# Patient Record
Sex: MALE | Race: WHITE
[De-identification: names, ages, dates, MRNs, and addresses within clinical notes are randomized per-mention and may not be internally consistent; named-entity substitution may affect disease eponyms.]

---

## 2019-07-17 ENCOUNTER — HOSPITAL ENCOUNTER (INPATIENT)
Dept: HOSPITAL 8 - ED | Age: 54
LOS: 1 days | Discharge: HOME | DRG: 394 | End: 2019-07-18
Attending: SURGERY | Admitting: SURGERY
Payer: MEDICAID

## 2019-07-17 VITALS — BODY MASS INDEX: 28.89 KG/M2 | HEIGHT: 71 IN | WEIGHT: 206.35 LBS

## 2019-07-17 VITALS — DIASTOLIC BLOOD PRESSURE: 86 MMHG | SYSTOLIC BLOOD PRESSURE: 128 MMHG

## 2019-07-17 VITALS — DIASTOLIC BLOOD PRESSURE: 93 MMHG | SYSTOLIC BLOOD PRESSURE: 161 MMHG

## 2019-07-17 DIAGNOSIS — F17.200: ICD-10-CM

## 2019-07-17 DIAGNOSIS — Z85.038: ICD-10-CM

## 2019-07-17 DIAGNOSIS — Z88.3: ICD-10-CM

## 2019-07-17 DIAGNOSIS — K43.5: Primary | ICD-10-CM

## 2019-07-17 DIAGNOSIS — K59.39: ICD-10-CM

## 2019-07-17 DIAGNOSIS — Z88.8: ICD-10-CM

## 2019-07-17 LAB
ALBUMIN SERPL-MCNC: 3.7 G/DL (ref 3.4–5)
ALP SERPL-CCNC: 114 U/L (ref 45–117)
ALT SERPL-CCNC: 26 U/L (ref 12–78)
ANION GAP SERPL CALC-SCNC: 5 MMOL/L (ref 5–15)
BASOPHILS # BLD AUTO: 0.02 X10^3/UL (ref 0–0.1)
BASOPHILS NFR BLD AUTO: 0 % (ref 0–1)
BILIRUB SERPL-MCNC: 0.8 MG/DL (ref 0.2–1)
CALCIUM SERPL-MCNC: 9.5 MG/DL (ref 8.5–10.1)
CHLORIDE SERPL-SCNC: 108 MMOL/L (ref 98–107)
CREAT SERPL-MCNC: 1.6 MG/DL (ref 0.7–1.3)
EOSINOPHIL # BLD AUTO: 0.12 X10^3/UL (ref 0–0.4)
EOSINOPHIL NFR BLD AUTO: 1 % (ref 1–7)
ERYTHROCYTE [DISTWIDTH] IN BLOOD BY AUTOMATED COUNT: 18.2 % (ref 9.4–14.8)
LYMPHOCYTES # BLD AUTO: 2.08 X10^3/UL (ref 1–3.4)
LYMPHOCYTES NFR BLD AUTO: 24 % (ref 22–44)
MCH RBC QN AUTO: 26 PG (ref 27.5–34.5)
MCHC RBC AUTO-ENTMCNC: 31.9 G/DL (ref 33.2–36.2)
MCV RBC AUTO: 81.6 FL (ref 81–97)
MD: NO
MONOCYTES # BLD AUTO: 0.43 X10^3/UL (ref 0.2–0.8)
MONOCYTES NFR BLD AUTO: 5 % (ref 2–9)
NEUTROPHILS # BLD AUTO: 5.97 X10^3/UL (ref 1.8–6.8)
NEUTROPHILS NFR BLD AUTO: 69 % (ref 42–75)
PLATELET # BLD AUTO: 435 X10^3/UL (ref 130–400)
PMV BLD AUTO: 6.3 FL (ref 7.4–10.4)
PROT SERPL-MCNC: 8.7 G/DL (ref 6.4–8.2)
RBC # BLD AUTO: 5.22 X10^6/UL (ref 4.38–5.82)

## 2019-07-17 PROCEDURE — 99285 EMERGENCY DEPT VISIT HI MDM: CPT

## 2019-07-17 PROCEDURE — 80053 COMPREHEN METABOLIC PANEL: CPT

## 2019-07-17 PROCEDURE — 83690 ASSAY OF LIPASE: CPT

## 2019-07-17 PROCEDURE — 74176 CT ABD & PELVIS W/O CONTRAST: CPT

## 2019-07-17 PROCEDURE — 85025 COMPLETE CBC W/AUTO DIFF WBC: CPT

## 2019-07-17 PROCEDURE — 36415 COLL VENOUS BLD VENIPUNCTURE: CPT

## 2019-07-17 RX ADMIN — HYDROMORPHONE HYDROCHLORIDE PRN MG: 2 INJECTION INTRAMUSCULAR; INTRAVENOUS; SUBCUTANEOUS at 11:26

## 2019-07-17 RX ADMIN — MORPHINE SULFATE PRN MG: 4 INJECTION INTRAVENOUS at 17:41

## 2019-07-17 RX ADMIN — MORPHINE SULFATE PRN MG: 4 INJECTION INTRAVENOUS at 20:35

## 2019-07-17 RX ADMIN — HYDROMORPHONE HYDROCHLORIDE PRN MG: 2 INJECTION INTRAMUSCULAR; INTRAVENOUS; SUBCUTANEOUS at 13:21

## 2019-07-17 RX ADMIN — MORPHINE SULFATE PRN MG: 4 INJECTION INTRAVENOUS at 23:21

## 2019-07-17 NOTE — NUR
PT IS REFUSING THE NG TUBE AT THIS TIME. POC DISCUSSED AND EDUCATION PROVIDED, 
PT VERBALIZED UNDERSTANDING.

## 2019-07-18 VITALS — SYSTOLIC BLOOD PRESSURE: 141 MMHG | DIASTOLIC BLOOD PRESSURE: 93 MMHG

## 2019-07-18 VITALS — DIASTOLIC BLOOD PRESSURE: 87 MMHG | SYSTOLIC BLOOD PRESSURE: 145 MMHG

## 2019-07-18 RX ADMIN — MORPHINE SULFATE PRN MG: 4 INJECTION INTRAVENOUS at 05:44

## 2019-07-18 RX ADMIN — MORPHINE SULFATE PRN MG: 4 INJECTION INTRAVENOUS at 09:30

## 2019-07-18 RX ADMIN — MORPHINE SULFATE PRN MG: 4 INJECTION INTRAVENOUS at 01:42

## 2019-07-20 ENCOUNTER — APPOINTMENT (OUTPATIENT)
Dept: RADIOLOGY | Facility: MEDICAL CENTER | Age: 54
DRG: 394 | End: 2019-07-20
Attending: EMERGENCY MEDICINE
Payer: COMMERCIAL

## 2019-07-20 ENCOUNTER — HOSPITAL ENCOUNTER (INPATIENT)
Facility: MEDICAL CENTER | Age: 54
LOS: 3 days | DRG: 394 | End: 2019-07-23
Attending: EMERGENCY MEDICINE | Admitting: HOSPITALIST
Payer: COMMERCIAL

## 2019-07-20 DIAGNOSIS — K56.609 SMALL BOWEL OBSTRUCTION (HCC): ICD-10-CM

## 2019-07-20 DIAGNOSIS — K46.0 INCARCERATED HERNIA: ICD-10-CM

## 2019-07-20 PROBLEM — D64.9 ANEMIA: Status: ACTIVE | Noted: 2019-07-20

## 2019-07-20 PROBLEM — Z72.0 TOBACCO ABUSE: Status: ACTIVE | Noted: 2019-07-20

## 2019-07-20 PROBLEM — D72.829 LEUKOCYTOSIS: Status: ACTIVE | Noted: 2019-07-20

## 2019-07-20 PROBLEM — N17.9 AKI (ACUTE KIDNEY INJURY) (HCC): Status: ACTIVE | Noted: 2019-07-20

## 2019-07-20 LAB
ALBUMIN SERPL BCP-MCNC: 4.2 G/DL (ref 3.2–4.9)
ALBUMIN/GLOB SERPL: 1.1 G/DL
ALP SERPL-CCNC: 89 U/L (ref 30–99)
ALT SERPL-CCNC: 11 U/L (ref 2–50)
ANION GAP SERPL CALC-SCNC: 9 MMOL/L (ref 0–11.9)
AST SERPL-CCNC: 14 U/L (ref 12–45)
BASOPHILS # BLD AUTO: 0.6 % (ref 0–1.8)
BASOPHILS # BLD: 0.06 K/UL (ref 0–0.12)
BILIRUB SERPL-MCNC: 0.6 MG/DL (ref 0.1–1.5)
BLOOD CULTURE HOLD CXBCH: NORMAL
BUN SERPL-MCNC: 34 MG/DL (ref 8–22)
CALCIUM SERPL-MCNC: 9.5 MG/DL (ref 8.5–10.5)
CHLORIDE SERPL-SCNC: 107 MMOL/L (ref 96–112)
CO2 SERPL-SCNC: 23 MMOL/L (ref 20–33)
CREAT SERPL-MCNC: 1.86 MG/DL (ref 0.5–1.4)
EOSINOPHIL # BLD AUTO: 0.1 K/UL (ref 0–0.51)
EOSINOPHIL NFR BLD: 1 % (ref 0–6.9)
ERYTHROCYTE [DISTWIDTH] IN BLOOD BY AUTOMATED COUNT: 51.5 FL (ref 35.9–50)
GLOBULIN SER CALC-MCNC: 3.7 G/DL (ref 1.9–3.5)
GLUCOSE SERPL-MCNC: 88 MG/DL (ref 65–99)
HCT VFR BLD AUTO: 43.2 % (ref 42–52)
HGB BLD-MCNC: 13.1 G/DL (ref 14–18)
IMM GRANULOCYTES # BLD AUTO: 0.04 K/UL (ref 0–0.11)
IMM GRANULOCYTES NFR BLD AUTO: 0.4 % (ref 0–0.9)
LIPASE SERPL-CCNC: 52 U/L (ref 11–82)
LYMPHOCYTES # BLD AUTO: 2.8 K/UL (ref 1–4.8)
LYMPHOCYTES NFR BLD: 29.1 % (ref 22–41)
MCH RBC QN AUTO: 24.8 PG (ref 27–33)
MCHC RBC AUTO-ENTMCNC: 30.3 G/DL (ref 33.7–35.3)
MCV RBC AUTO: 81.8 FL (ref 81.4–97.8)
MONOCYTES # BLD AUTO: 0.81 K/UL (ref 0–0.85)
MONOCYTES NFR BLD AUTO: 8.4 % (ref 0–13.4)
NEUTROPHILS # BLD AUTO: 5.8 K/UL (ref 1.82–7.42)
NEUTROPHILS NFR BLD: 60.5 % (ref 44–72)
NRBC # BLD AUTO: 0 K/UL
NRBC BLD-RTO: 0 /100 WBC
PLATELET # BLD AUTO: 321 K/UL (ref 164–446)
PMV BLD AUTO: 8.3 FL (ref 9–12.9)
POTASSIUM SERPL-SCNC: 4.4 MMOL/L (ref 3.6–5.5)
PROT SERPL-MCNC: 7.9 G/DL (ref 6–8.2)
RBC # BLD AUTO: 5.28 M/UL (ref 4.7–6.1)
SODIUM SERPL-SCNC: 139 MMOL/L (ref 135–145)
WBC # BLD AUTO: 9.6 K/UL (ref 4.8–10.8)

## 2019-07-20 PROCEDURE — 99407 BEHAV CHNG SMOKING > 10 MIN: CPT | Performed by: HOSPITALIST

## 2019-07-20 PROCEDURE — 96374 THER/PROPH/DIAG INJ IV PUSH: CPT

## 2019-07-20 PROCEDURE — 700111 HCHG RX REV CODE 636 W/ 250 OVERRIDE (IP): Performed by: HOSPITALIST

## 2019-07-20 PROCEDURE — 99285 EMERGENCY DEPT VISIT HI MDM: CPT

## 2019-07-20 PROCEDURE — 96375 TX/PRO/DX INJ NEW DRUG ADDON: CPT

## 2019-07-20 PROCEDURE — 700105 HCHG RX REV CODE 258: Performed by: EMERGENCY MEDICINE

## 2019-07-20 PROCEDURE — 74176 CT ABD & PELVIS W/O CONTRAST: CPT

## 2019-07-20 PROCEDURE — 770001 HCHG ROOM/CARE - MED/SURG/GYN PRIV*

## 2019-07-20 PROCEDURE — 99223 1ST HOSP IP/OBS HIGH 75: CPT | Mod: 25 | Performed by: HOSPITALIST

## 2019-07-20 PROCEDURE — 80053 COMPREHEN METABOLIC PANEL: CPT

## 2019-07-20 PROCEDURE — 85025 COMPLETE CBC W/AUTO DIFF WBC: CPT

## 2019-07-20 PROCEDURE — 83690 ASSAY OF LIPASE: CPT

## 2019-07-20 PROCEDURE — 700111 HCHG RX REV CODE 636 W/ 250 OVERRIDE (IP): Performed by: EMERGENCY MEDICINE

## 2019-07-20 PROCEDURE — 700105 HCHG RX REV CODE 258: Performed by: HOSPITALIST

## 2019-07-20 RX ORDER — SODIUM CHLORIDE 9 MG/ML
1000 INJECTION, SOLUTION INTRAVENOUS ONCE
Status: COMPLETED | OUTPATIENT
Start: 2019-07-20 | End: 2019-07-20

## 2019-07-20 RX ORDER — NICOTINE 21 MG/24HR
14 PATCH, TRANSDERMAL 24 HOURS TRANSDERMAL
Status: DISCONTINUED | OUTPATIENT
Start: 2019-07-21 | End: 2019-07-23 | Stop reason: HOSPADM

## 2019-07-20 RX ORDER — MORPHINE SULFATE 4 MG/ML
4 INJECTION, SOLUTION INTRAMUSCULAR; INTRAVENOUS
Status: DISCONTINUED | OUTPATIENT
Start: 2019-07-20 | End: 2019-07-23 | Stop reason: HOSPADM

## 2019-07-20 RX ORDER — POLYETHYLENE GLYCOL 3350 17 G/17G
1 POWDER, FOR SOLUTION ORAL
Status: DISCONTINUED | OUTPATIENT
Start: 2019-07-20 | End: 2019-07-20

## 2019-07-20 RX ORDER — HYDROMORPHONE HYDROCHLORIDE 1 MG/ML
1 INJECTION, SOLUTION INTRAMUSCULAR; INTRAVENOUS; SUBCUTANEOUS ONCE
Status: COMPLETED | OUTPATIENT
Start: 2019-07-20 | End: 2019-07-20

## 2019-07-20 RX ORDER — ONDANSETRON 2 MG/ML
4 INJECTION INTRAMUSCULAR; INTRAVENOUS EVERY 4 HOURS PRN
Status: DISCONTINUED | OUTPATIENT
Start: 2019-07-20 | End: 2019-07-23 | Stop reason: HOSPADM

## 2019-07-20 RX ORDER — SODIUM CHLORIDE 9 MG/ML
INJECTION, SOLUTION INTRAVENOUS CONTINUOUS
Status: DISCONTINUED | OUTPATIENT
Start: 2019-07-20 | End: 2019-07-23 | Stop reason: HOSPADM

## 2019-07-20 RX ORDER — AMOXICILLIN 250 MG
2 CAPSULE ORAL 2 TIMES DAILY
Status: DISCONTINUED | OUTPATIENT
Start: 2019-07-20 | End: 2019-07-20

## 2019-07-20 RX ORDER — ONDANSETRON 2 MG/ML
4 INJECTION INTRAMUSCULAR; INTRAVENOUS ONCE
Status: COMPLETED | OUTPATIENT
Start: 2019-07-20 | End: 2019-07-20

## 2019-07-20 RX ORDER — OXYCODONE HYDROCHLORIDE 5 MG/1
5 TABLET ORAL
Status: DISCONTINUED | OUTPATIENT
Start: 2019-07-20 | End: 2019-07-23 | Stop reason: HOSPADM

## 2019-07-20 RX ORDER — OXYCODONE HYDROCHLORIDE 10 MG/1
10 TABLET ORAL
Status: DISCONTINUED | OUTPATIENT
Start: 2019-07-20 | End: 2019-07-23 | Stop reason: HOSPADM

## 2019-07-20 RX ORDER — PROMETHAZINE HYDROCHLORIDE 25 MG/1
12.5-25 TABLET ORAL EVERY 4 HOURS PRN
Status: DISCONTINUED | OUTPATIENT
Start: 2019-07-20 | End: 2019-07-23 | Stop reason: HOSPADM

## 2019-07-20 RX ORDER — BISACODYL 10 MG
10 SUPPOSITORY, RECTAL RECTAL
Status: DISCONTINUED | OUTPATIENT
Start: 2019-07-20 | End: 2019-07-20

## 2019-07-20 RX ORDER — ONDANSETRON 4 MG/1
4 TABLET, ORALLY DISINTEGRATING ORAL EVERY 4 HOURS PRN
Status: DISCONTINUED | OUTPATIENT
Start: 2019-07-20 | End: 2019-07-23 | Stop reason: HOSPADM

## 2019-07-20 RX ORDER — PROMETHAZINE HYDROCHLORIDE 25 MG/1
12.5-25 SUPPOSITORY RECTAL EVERY 4 HOURS PRN
Status: DISCONTINUED | OUTPATIENT
Start: 2019-07-20 | End: 2019-07-23 | Stop reason: HOSPADM

## 2019-07-20 RX ADMIN — SODIUM CHLORIDE 1000 ML: 9 INJECTION, SOLUTION INTRAVENOUS at 18:31

## 2019-07-20 RX ADMIN — HYDROMORPHONE HYDROCHLORIDE 1 MG: 1 INJECTION, SOLUTION INTRAMUSCULAR; INTRAVENOUS; SUBCUTANEOUS at 19:50

## 2019-07-20 RX ADMIN — ONDANSETRON 4 MG: 2 INJECTION INTRAMUSCULAR; INTRAVENOUS at 18:31

## 2019-07-20 RX ADMIN — FENTANYL CITRATE 100 MCG: 0.05 INJECTION, SOLUTION INTRAMUSCULAR; INTRAVENOUS at 18:33

## 2019-07-20 RX ADMIN — SODIUM CHLORIDE: 9 INJECTION, SOLUTION INTRAVENOUS at 23:31

## 2019-07-20 RX ADMIN — MORPHINE SULFATE 4 MG: 4 INJECTION INTRAVENOUS at 23:22

## 2019-07-20 ASSESSMENT — ENCOUNTER SYMPTOMS
DIZZINESS: 0
EYE DISCHARGE: 0
MYALGIAS: 0
HALLUCINATIONS: 0
PALPITATIONS: 0
SPEECH CHANGE: 0
HEARTBURN: 0
DEPRESSION: 0
ABDOMINAL PAIN: 1
FOCAL WEAKNESS: 0
FEVER: 0
SHORTNESS OF BREATH: 0
BRUISES/BLEEDS EASILY: 0
BLURRED VISION: 0
HEMOPTYSIS: 0
CHILLS: 0
FLANK PAIN: 0
COUGH: 0
NAUSEA: 1
DOUBLE VISION: 0
SENSORY CHANGE: 0
VOMITING: 1
WEAKNESS: 1

## 2019-07-20 ASSESSMENT — LIFESTYLE VARIABLES: SUBSTANCE_ABUSE: 0

## 2019-07-21 ENCOUNTER — APPOINTMENT (OUTPATIENT)
Dept: RADIOLOGY | Facility: MEDICAL CENTER | Age: 54
DRG: 394 | End: 2019-07-21
Attending: HOSPITALIST
Payer: COMMERCIAL

## 2019-07-21 LAB
ALBUMIN SERPL BCP-MCNC: 3.5 G/DL (ref 3.2–4.9)
ALBUMIN/GLOB SERPL: 1.1 G/DL
ALP SERPL-CCNC: 78 U/L (ref 30–99)
ALT SERPL-CCNC: 11 U/L (ref 2–50)
ANION GAP SERPL CALC-SCNC: 8 MMOL/L (ref 0–11.9)
APTT PPP: 30.5 SEC (ref 24.7–36)
AST SERPL-CCNC: 13 U/L (ref 12–45)
BASOPHILS # BLD AUTO: 0.5 % (ref 0–1.8)
BASOPHILS # BLD: 0.04 K/UL (ref 0–0.12)
BILIRUB SERPL-MCNC: 0.6 MG/DL (ref 0.1–1.5)
BUN SERPL-MCNC: 30 MG/DL (ref 8–22)
CALCIUM SERPL-MCNC: 8.9 MG/DL (ref 8.5–10.5)
CHLORIDE SERPL-SCNC: 111 MMOL/L (ref 96–112)
CO2 SERPL-SCNC: 19 MMOL/L (ref 20–33)
CREAT SERPL-MCNC: 1.57 MG/DL (ref 0.5–1.4)
EOSINOPHIL # BLD AUTO: 0.14 K/UL (ref 0–0.51)
EOSINOPHIL NFR BLD: 1.9 % (ref 0–6.9)
ERYTHROCYTE [DISTWIDTH] IN BLOOD BY AUTOMATED COUNT: 52.1 FL (ref 35.9–50)
GLOBULIN SER CALC-MCNC: 3.1 G/DL (ref 1.9–3.5)
GLUCOSE SERPL-MCNC: 90 MG/DL (ref 65–99)
HCT VFR BLD AUTO: 38.7 % (ref 42–52)
HGB BLD-MCNC: 11.7 G/DL (ref 14–18)
IMM GRANULOCYTES # BLD AUTO: 0.02 K/UL (ref 0–0.11)
IMM GRANULOCYTES NFR BLD AUTO: 0.3 % (ref 0–0.9)
INR PPP: 1.05 (ref 0.87–1.13)
IRON SATN MFR SERPL: 13 % (ref 15–55)
IRON SERPL-MCNC: 44 UG/DL (ref 50–180)
LACTATE BLD-SCNC: 0.7 MMOL/L (ref 0.5–2)
LYMPHOCYTES # BLD AUTO: 2.77 K/UL (ref 1–4.8)
LYMPHOCYTES NFR BLD: 36.6 % (ref 22–41)
MCH RBC QN AUTO: 24.8 PG (ref 27–33)
MCHC RBC AUTO-ENTMCNC: 30.2 G/DL (ref 33.7–35.3)
MCV RBC AUTO: 82.2 FL (ref 81.4–97.8)
MONOCYTES # BLD AUTO: 0.49 K/UL (ref 0–0.85)
MONOCYTES NFR BLD AUTO: 6.5 % (ref 0–13.4)
NEUTROPHILS # BLD AUTO: 4.1 K/UL (ref 1.82–7.42)
NEUTROPHILS NFR BLD: 54.2 % (ref 44–72)
NRBC # BLD AUTO: 0 K/UL
NRBC BLD-RTO: 0 /100 WBC
PLATELET # BLD AUTO: 262 K/UL (ref 164–446)
PMV BLD AUTO: 8.7 FL (ref 9–12.9)
POTASSIUM SERPL-SCNC: 4.2 MMOL/L (ref 3.6–5.5)
PROT SERPL-MCNC: 6.6 G/DL (ref 6–8.2)
PROTHROMBIN TIME: 14 SEC (ref 12–14.6)
RBC # BLD AUTO: 4.71 M/UL (ref 4.7–6.1)
SODIUM SERPL-SCNC: 138 MMOL/L (ref 135–145)
TIBC SERPL-MCNC: 339 UG/DL (ref 250–450)
WBC # BLD AUTO: 7.6 K/UL (ref 4.8–10.8)

## 2019-07-21 PROCEDURE — 302110 OSTOMY WAFER 8X8: Performed by: HOSPITALIST

## 2019-07-21 PROCEDURE — 700111 HCHG RX REV CODE 636 W/ 250 OVERRIDE (IP): Performed by: HOSPITALIST

## 2019-07-21 PROCEDURE — 83550 IRON BINDING TEST: CPT

## 2019-07-21 PROCEDURE — 302111 WAFER OST 2.25IN N IMG RD 2 PC (BARRIER): Performed by: HOSPITALIST

## 2019-07-21 PROCEDURE — 85025 COMPLETE CBC W/AUTO DIFF WBC: CPT

## 2019-07-21 PROCEDURE — 85610 PROTHROMBIN TIME: CPT

## 2019-07-21 PROCEDURE — 83605 ASSAY OF LACTIC ACID: CPT

## 2019-07-21 PROCEDURE — 302109 OSTOMY WAFER 4X4: Performed by: HOSPITALIST

## 2019-07-21 PROCEDURE — 83540 ASSAY OF IRON: CPT

## 2019-07-21 PROCEDURE — 36415 COLL VENOUS BLD VENIPUNCTURE: CPT

## 2019-07-21 PROCEDURE — 770001 HCHG ROOM/CARE - MED/SURG/GYN PRIV*

## 2019-07-21 PROCEDURE — 80053 COMPREHEN METABOLIC PANEL: CPT

## 2019-07-21 PROCEDURE — 302102 BAG OST N IMG 2.25IN 2PC (FECAL): Performed by: HOSPITALIST

## 2019-07-21 PROCEDURE — 85730 THROMBOPLASTIN TIME PARTIAL: CPT

## 2019-07-21 PROCEDURE — 700105 HCHG RX REV CODE 258: Performed by: HOSPITALIST

## 2019-07-21 PROCEDURE — 302085 CLAMP OSTOMY: Performed by: HOSPITALIST

## 2019-07-21 PROCEDURE — 99232 SBSQ HOSP IP/OBS MODERATE 35: CPT | Performed by: HOSPITALIST

## 2019-07-21 RX ADMIN — MORPHINE SULFATE 4 MG: 4 INJECTION INTRAVENOUS at 03:24

## 2019-07-21 RX ADMIN — MORPHINE SULFATE 4 MG: 4 INJECTION INTRAVENOUS at 19:53

## 2019-07-21 RX ADMIN — MORPHINE SULFATE 4 MG: 4 INJECTION INTRAVENOUS at 23:27

## 2019-07-21 RX ADMIN — MORPHINE SULFATE 4 MG: 4 INJECTION INTRAVENOUS at 07:16

## 2019-07-21 RX ADMIN — SODIUM CHLORIDE: 9 INJECTION, SOLUTION INTRAVENOUS at 19:53

## 2019-07-21 RX ADMIN — MORPHINE SULFATE 4 MG: 4 INJECTION INTRAVENOUS at 13:26

## 2019-07-21 RX ADMIN — MORPHINE SULFATE 4 MG: 4 INJECTION INTRAVENOUS at 10:15

## 2019-07-21 RX ADMIN — MORPHINE SULFATE 4 MG: 4 INJECTION INTRAVENOUS at 16:36

## 2019-07-21 RX ADMIN — SODIUM CHLORIDE: 9 INJECTION, SOLUTION INTRAVENOUS at 05:57

## 2019-07-21 ASSESSMENT — ENCOUNTER SYMPTOMS
SHORTNESS OF BREATH: 0
BACK PAIN: 0
CHILLS: 0
DIARRHEA: 0
PALPITATIONS: 0
WEAKNESS: 0
ABDOMINAL PAIN: 1
TREMORS: 0
SENSORY CHANGE: 0
DIAPHORESIS: 0
VOMITING: 1
COUGH: 0
FEVER: 0
SPEECH CHANGE: 0
STRIDOR: 0
FOCAL WEAKNESS: 0
NAUSEA: 1
WHEEZING: 0
FLANK PAIN: 0
NECK PAIN: 0

## 2019-07-21 ASSESSMENT — COGNITIVE AND FUNCTIONAL STATUS - GENERAL
MOBILITY SCORE: 24
DAILY ACTIVITIY SCORE: 24
SUGGESTED CMS G CODE MODIFIER DAILY ACTIVITY: CH
SUGGESTED CMS G CODE MODIFIER MOBILITY: CH

## 2019-07-21 ASSESSMENT — PATIENT HEALTH QUESTIONNAIRE - PHQ9
1. LITTLE INTEREST OR PLEASURE IN DOING THINGS: NOT AT ALL
SUM OF ALL RESPONSES TO PHQ9 QUESTIONS 1 AND 2: 0
2. FEELING DOWN, DEPRESSED, IRRITABLE, OR HOPELESS: NOT AT ALL

## 2019-07-21 ASSESSMENT — LIFESTYLE VARIABLES: ALCOHOL_USE: NO

## 2019-07-21 NOTE — ED TRIAGE NOTES
Chief Complaint   Patient presents with   • Other     no colostomy output x3days   • Abdominal Pain     +swelling   • N/V     [facility] with above complaints. Pt has Ileostomy and has not had output x3days, he was diagnosed with jennie-ostomal hernia . Pt said that he had blockage in the past.

## 2019-07-21 NOTE — PROGRESS NOTES
Nicotine patch refused by guards at bedside. half-way policy prohibits patient receiving patch. Claims patient has not been given patch at the correction.

## 2019-07-21 NOTE — ED NOTES
Med Rec Updated and Complete per Pt at bedside  Allergies Reviewed  No PO ABX last 14 days.    Pt denies RX or OTC medication at this time.

## 2019-07-21 NOTE — ASSESSMENT & PLAN NOTE
Mild, no evidence of bleeding-possibly due to chronic dz, nutritional , dilution.  B12, thyroid function normal

## 2019-07-21 NOTE — ED NOTES
Bedside report given to RN Pritesh. Wound care called for ostomy supplies per MD and message left.

## 2019-07-21 NOTE — CONSULTS
"Surgical Consultation    Date: 7/20/2019    Requesting Physician: Dr. Waters  PCP: Pcp Pt States None  Consulting Physician: Shady Soler M.D.    CC: Abdominal pain, nausea    HPI: This is a 53 y.o. incarcerated male with a history of colon cancer for which he said he had \"my entire colon removed and an ileostomy placed\".  He is presenting with a few days of abdominal discomfort at the area of his ileostomy as well as nausea and vomiting.  He reports minimal fecal output from the colostomy bag over the last 3 days, although the bag is currently full of air almost to the point of bursting.  He currently does not feel distended.  No fever or chills.  No leukocytosis.  His kidney function is reduced.  No hematemesis, chest pain, shortness of breath, neurologic symptoms.    Past Medical History:   Diagnosis Date   • Colon cancer (HCC)        Past Surgical History:   Procedure Laterality Date   • ILEOSTOMY         Current Facility-Administered Medications   Medication Dose Route Frequency Provider Last Rate Last Dose   • NS infusion   Intravenous Continuous Estrellita Sommers M.D.       • ondansetron (ZOFRAN) syringe/vial injection 4 mg  4 mg Intravenous Q4HRS PRN Estrellita Sommers M.D.       • ondansetron (ZOFRAN ODT) dispertab 4 mg  4 mg Oral Q4HRS PRN Estrellita Sommers M.D.       • promethazine (PHENERGAN) tablet 12.5-25 mg  12.5-25 mg Oral Q4HRS PRN Estrellita Sommers M.D.       • promethazine (PHENERGAN) suppository 12.5-25 mg  12.5-25 mg Rectal Q4HRS PRN Estrellita Sommers M.D.       • prochlorperazine (COMPAZINE) injection 5-10 mg  5-10 mg Intravenous Q4HRS PRN Estrellita Sommers M.D.       • Pharmacy Consult Request ...Pain Management Review 1 Each  1 Each Other PHARMACY TO DOSE Estrellita Sommers M.D.        And   • oxyCODONE immediate-release (ROXICODONE) tablet 5 mg  5 mg Oral Q3HRS PRN Estrellita Sommers M.D.        And   • oxyCODONE immediate-release (ROXICODONE) tablet 10 mg  10 mg Oral Q3HRS PRN Estrellita QUISPE" "CHASE Sommers        And   • morphine (pf) 4 mg/ml injection 4 mg  4 mg Intravenous Q3HRS PRN Estrellita Sommers M.D.       • [START ON 7/21/2019] nicotine (NICODERM) 14 MG/24HR 14 mg  14 mg Transdermal Daily-0600 Estrellita Sommers M.D.         No current outpatient prescriptions on file.       Social History     Social History   • Marital status: Legally      Spouse name: N/A   • Number of children: N/A   • Years of education: N/A     Occupational History   • Not on file.     Social History Main Topics   • Smoking status: Current Every Day Smoker     Packs/day: 1.00   • Smokeless tobacco: Never Used   • Alcohol use No   • Drug use: Yes      Comment: heroin last use 7/12/2019   • Sexual activity: Not on file     Other Topics Concern   • Not on file     Social History Narrative   • No narrative on file       History reviewed. No pertinent family history.    Allergies:  Motrin [advil cold-sinus] and Toradol    Review of Systems:  Negative except as noted above in the HPI on 10 point review    Physical Exam:  /104   Pulse 79   Temp 36.7 °C (98 °F) (Temporal)   Resp (!) 1   Ht 1.803 m (5' 11\")   Wt 83.9 kg (185 lb)   SpO2 100%     Constitutional: he is oriented to person, place, and time.  he appears appropriately-developed and well-nourished. No distress.   Head: Normocephalic and atraumatic.   Neck: Normal range of motion. Neck supple. No JVD present. No tracheal deviation present.   Cardiovascular: Normal rate, regular rhythm  Pulmonary/Chest: Breathing is nonlabored on room air, no stridor, no respiratory distress  Abdominal: Soft, nondistended, right lower quadrant ileostomy with appliance in place which is very full of air.  Palpable parastomal hernia with moderate tenderness but no guarding.  No overlying skin change.  Well-healed incisions  Musculoskeletal: Normal range of motion. he exhibits no edema and no tenderness.   Neurological: he is alert and oriented to person, place, and time.  No " gross deficit noted  Skin: Skin is warm and dry. No rash noted. he is not diaphoretic. No erythema. No pallor.   Psychiatric: he has a normal mood and affect.  Behavior is normal.       Labs:  Recent Labs      07/20/19 1818   WBC  9.6   RBC  5.28   HEMOGLOBIN  13.1*   HEMATOCRIT  43.2   MCV  81.8   MCH  24.8*   MCHC  30.3*   RDW  51.5*   PLATELETCT  321   MPV  8.3*     Recent Labs      07/20/19 1818   SODIUM  139   POTASSIUM  4.4   CHLORIDE  107   CO2  23   GLUCOSE  88   BUN  34*   CREATININE  1.86*   CALCIUM  9.5         Recent Labs      07/20/19   1818   ASTSGOT  14   ALTSGPT  11   TBILIRUBIN  0.6   ALKPHOSPHAT  89   GLOBULIN  3.7*       Radiology:  CT-ABDOMEN-PELVIS W/O   Final Result      1.  Right lower quadrant colostomy with peristomal colonic herniation which appears to be at least partly incarcerated, likely accounting for the generalized dilated colon and small bowel throughout the abdomen i.e., at least a partial obstruction.      DX-UPPER GI-SMALL BOWEL FOLLOW THRU    (Results Pending)       Assessment: This is a 53 y.o. incarcerated male with a reported history of total abdominal colectomy and end ileostomy who says he has had a parastomal hernia for 2 years.  He presents with a few days of decreased ostomy output and dehydration.  His abdomen although tender is overall benign and there are no overlying skin changes.  CT read as partial obstruction, but the patient's ostomy is turgid with air.  Appears to be sent for equalization of the small intestine.  He is hemodynamically stable.        Recommendations:   -Given that he is passing air, recommend small bowel follow-through tomorrow.  -In the meantime, if he develops nausea/vomiting, recommend NG tube placement to low wall suction continuous  -IV fluid resuscitation  -No indication for urgent operation at this time.  I will continue to follow and will likely discuss this patient's case with 1 of my colorectal surgery partners for elective  parastomal hernia repair.  -Okay for some ice chips (1 medicine cup per hour)      Shady Soler M.D.  [facility]  [phone]

## 2019-07-21 NOTE — H&P
Hospital Medicine History & Physical Note    Date of Service  7/20/2019    Primary Care Physician  Pcp Pt States None    Consultants  Surgery    Code Status  full    Chief Complaint  abd pain n/v    History of Presenting Illness  53 y.o. male who presented 7/20/2019 with Past medical history of previous colostomy secondary to colon cancer who presents with abdominal pain.  He has not had any output from his colostomy bag over the last 3 days.  He is a diffuse abdominal pain worse with eating with associated nausea and vomiting.  He has had very poor oral intake over the last several days.  He has had diffuse abdominal pain that is also crampy in nature.  Moderate in intensity.  Found to have evidence of incarcerated hernia and small bowel obstruction general surgery at Dr. Soler has been consulted.  She will be admitted to the hospital for further evaluation.    Review of Systems  Review of Systems   Constitutional: Negative for chills and fever.   HENT: Negative for congestion, hearing loss and tinnitus.    Eyes: Negative for blurred vision, double vision and discharge.   Respiratory: Negative for cough, hemoptysis and shortness of breath.    Cardiovascular: Negative for chest pain, palpitations and leg swelling.   Gastrointestinal: Positive for abdominal pain, nausea and vomiting. Negative for heartburn.   Genitourinary: Negative for dysuria and flank pain.   Musculoskeletal: Negative for joint pain and myalgias.   Skin: Negative for rash.   Neurological: Positive for weakness. Negative for dizziness, sensory change, speech change and focal weakness.   Endo/Heme/Allergies: Negative for environmental allergies. Does not bruise/bleed easily.   Psychiatric/Behavioral: Negative for depression, hallucinations and substance abuse.       Past Medical History   has a past medical history of Colon cancer (HCC).    Surgical History   has a past surgical history that includes ileostomy.     Family History  Reviewed and not  pertinent     Social History   reports that he has been smoking.  He has been smoking about 1.00 pack per day. He has never used smokeless tobacco. He reports that he uses drugs. He reports that he does not drink alcohol.    Allergies  Allergies   Allergen Reactions   • Motrin [Advil Cold-Sinus]    • Toradol        Medications  None       Physical Exam  Temp:  [36.7 °C (98 °F)] 36.7 °C (98 °F)  Pulse:  [] 79  Resp:  [1-18] 1  BP: (142)/(104) 142/104  SpO2:  [98 %-100 %] 100 %    Physical Exam   Constitutional: He is oriented to person, place, and time. He appears well-developed and well-nourished. He appears distressed.   HENT:   Head: Normocephalic and atraumatic.   Eyes: Pupils are equal, round, and reactive to light. Conjunctivae and EOM are normal.   Neck: Normal range of motion. Neck supple. No JVD present.   Cardiovascular: Normal rate, regular rhythm, normal heart sounds and intact distal pulses.    No murmur heard.  Pulmonary/Chest: Effort normal and breath sounds normal. No respiratory distress. He exhibits no tenderness.   Abdominal: Soft. Bowel sounds are normal. He exhibits distension. There is tenderness.   Ileostomy    Musculoskeletal: Normal range of motion. He exhibits no edema.   Neurological: He is alert and oriented to person, place, and time. No cranial nerve deficit. He exhibits normal muscle tone.   Skin: Skin is warm and dry. No erythema.   Psychiatric: He has a normal mood and affect. His behavior is normal. Judgment and thought content normal.   Nursing note and vitals reviewed.      Laboratory:  Recent Labs      07/20/19 1818   WBC  9.6   RBC  5.28   HEMOGLOBIN  13.1*   HEMATOCRIT  43.2   MCV  81.8   MCH  24.8*   MCHC  30.3*   RDW  51.5*   PLATELETCT  321   MPV  8.3*     Recent Labs      07/20/19 1818   SODIUM  139   POTASSIUM  4.4   CHLORIDE  107   CO2  23   GLUCOSE  88   BUN  34*   CREATININE  1.86*   CALCIUM  9.5     Recent Labs      07/20/19 1818   ALTSGPT  11   ASTSGOT  14    ALKPHOSPHAT  89   TBILIRUBIN  0.6   LIPASE  52   GLUCOSE  88         No results for input(s): NTPROBNP in the last 72 hours.      No results for input(s): TROPONINT in the last 72 hours.    Urinalysis:    No results found     Imaging:  CT-ABDOMEN-PELVIS W/O   Final Result      1.  Right lower quadrant colostomy with peristomal colonic herniation which appears to be at least partly incarcerated, likely accounting for the generalized dilated colon and small bowel throughout the abdomen i.e., at least a partial obstruction.            Assessment/Plan:  I anticipate this patient will require at least two midnights for appropriate medical management, necessitating inpatient admission.    * Small bowel obstruction (HCC)   Assessment & Plan    Questionable bowel obstruction he does have air and gas in his ileostomy site  NPO for now   Hold NG tube, will place if he has vomiting   IVF   SBF ordered for eval      Anemia   Assessment & Plan    Mild, no evidence of bleeding  Cont to monitor      Incarcerated hernia   Assessment & Plan    NPO  Surgery has been consulted  Check lactic     CLINTON (acute kidney injury) (HCC)   Assessment & Plan    This is hypovolemic due to vomiting and poor intake   IVF for now   Recheck renal function in am   Avoid nephrotoxic medications     Tobacco abuse   Assessment & Plan    Greater than 10 minutes spent with patient smoking cessation counseling. Discussed cardiovascular risk factors of smoking, nicotine patch ordered         VTE prophylaxis: scd

## 2019-07-21 NOTE — ASSESSMENT & PLAN NOTE
Greater than 10 minutes spent with patient smoking cessation counseling started. Emphasize cessation  Nicotine patch.

## 2019-07-21 NOTE — CARE PLAN
Problem: Safety  Goal: Will remain free from injury    Intervention: Provide assistance with mobility  Patient will call before getting up.      Problem: Pain Management  Goal: Pain level will decrease to patient's comfort goal    Intervention: Follow pain managment plan developed in collaboration with patient and Interdisciplinary Team  Patient will be aware of pain managment interventions available.

## 2019-07-21 NOTE — PROGRESS NOTES
Surgery    No acute events.  Ileostomy is now putting out modest amounts of material in addition to the large quantity of gas.  NG tube placed overnight but patient denies nausea and vomiting currently.  Still reports mild discomfort.    Awake, alert, NAD  Breathing is nonlabored on room air  Abdomen is soft, nondistended, the parastomal hernia bulge appears less distended today and there is no overlying skin changes and minimal tenderness.  Large amount of gas again in the ostomy appliance    -Continue with plan for small bowel follow-through today  -Pending findings of small bowel follow-through, gentle bowel regimen  -If small arthralgias are negative, recommend advancing diet to a GI soft  -The patient may follow-up with my colorectal surgery partner for evaluation for elective parastomal hernia repair after discharge.    Shady Soler M.D.  [facility]  [phone]

## 2019-07-21 NOTE — ASSESSMENT & PLAN NOTE
Clinically better.  SBFT without signs of obstruction  IV fluids, plan to start full liquid diet and advance as tolerated  Surgery input.   Prn antiemetics

## 2019-07-21 NOTE — ED PROVIDER NOTES
ED Provider Note    CHIEF COMPLAINT  Chief Complaint   Patient presents with   • Other     no colostomy output x3days   • Abdominal Pain     +swelling   • N/V       HPI  Federico Chaney is a 53 y.o. male who presents with abdominal pain.  The patient has a colostomy secondary to colon cancer in the past.  He states he has not had any output from the colostomy bag in the last 3 days.  The patient presents with diffuse abdominal pain is worse with eating.  He does have associated vomiting as well as anorexia.  He has not had any fevers.  The patient denies difficulties with urination.  The patient states the pain is diffuse and cramping.  The pain at this time is currently moderate in intensity.    REVIEW OF SYSTEMS  See HPI for further details. All other systems are negative.     PAST MEDICAL HISTORY  Past Medical History:   Diagnosis Date   • Colon cancer (HCC)        FAMILY HISTORY  [unfilled]    SOCIAL HISTORY  Social History     Social History   • Marital status: Legally      Spouse name: N/A   • Number of children: N/A   • Years of education: N/A     Social History Main Topics   • Smoking status: Current Every Day Smoker     Packs/day: 1.00   • Smokeless tobacco: Never Used   • Alcohol use No   • Drug use: Yes      Comment: heroin last use 7/12/2019   • Sexual activity: Not on file     Other Topics Concern   • Not on file     Social History Narrative   • No narrative on file       SURGICAL HISTORY  Past Surgical History:   Procedure Laterality Date   • ILEOSTOMY         CURRENT MEDICATIONS  Home Medications     Reviewed by Azalea Mari R.N. (Registered Nurse) on 07/20/19 at 1730  Med List Status: Unable to Obtain   Medication Last Dose Status        Patient Jagdeep Taking any Medications                       ALLERGIES  Allergies   Allergen Reactions   • Motrin [Advil Cold-Sinus]    • Toradol        PHYSICAL EXAM  VITAL SIGNS: /104   Pulse (!) 120   Temp 36.7 °C (98 °F) (Temporal)   Resp 18   " Ht 1.803 m (5' 11\")   Wt 83.9 kg (185 lb)   SpO2 100%   BMI 25.80 kg/m²       Constitutional: Mild acute distress, Non-toxic appearance.   HENT: Normocephalic, Atraumatic, Bilateral external ears normal, Oropharynx moist, No oral exudates, Nose normal.   Eyes: PERRLA, EOMI, Conjunctiva normal, No discharge.   Neck: Normal range of motion, No tenderness, Supple, No stridor.   Lymphatic: No lymphadenopathy noted.   Cardiovascular: Tachycardic heart rate, Normal rhythm, No murmurs, No rubs, No gallops.   Thorax & Lungs: Normal breath sounds, No respiratory distress, No wheezing, No chest tenderness.   Abdomen: Diffuse tenderness, voluntary guarding, hypoactive bowel sounds, the patient does have significant tenderness as well as fullness surrounding the ostomy site with no obvious output.    Skin: Warm, Dry, No erythema, No rash.   Back: No tenderness, No CVA tenderness.   Extremities: Intact distal pulses, No edema, No tenderness, No cyanosis, No clubbing.   Musculoskeletal: Good range of motion in all major joints. No tenderness to palpation or major deformities noted.   Neurologic: Alert & oriented x 3, Normal motor function, Normal sensory function, No focal deficits noted.   Psychiatric: Affect normal, Judgment normal, Mood normal.     RADIOLOGY/PROCEDURES  CT-ABDOMEN-PELVIS W/O   Final Result      1.  Right lower quadrant colostomy with peristomal colonic herniation which appears to be at least partly incarcerated, likely accounting for the generalized dilated colon and small bowel throughout the abdomen i.e., at least a partial obstruction.            COURSE & MEDICAL DECISION MAKING  Pertinent Labs & Imaging studies reviewed. (See chart for details)  This a 53-year-old gentleman who presents the emerge department with abdominal pain.  He does have some significant swelling and tenderness around the ostomy site.  The CT scan does show an incarcerated hernia in this region causing a partial obstruction.  I " suspect this is the cause of the patient's discomfort.  I was unable to mechanically reduce the hernia due to significant pain.  Therefore surgery has been consulted.  The patient does not appear toxic nor septic.  He has received a couple doses of pain medication.  He was also tachycardic on arrival and due to his poor oral intake I suspect that he had dehydration and therefore received a liter fluid intravenously.  On repeat exam he does feel better after the intravenous hydration.  The patient's BUN and creatinine is elevated and some of this could be from dehydration.    FINAL IMPRESSION  1.  Incarcerated colonic hernia through the ostomy site    Disposition  The patient will be admitted for surgical intervention         Electronically signed by: Chris Waters, 7/20/2019 6:11 PM

## 2019-07-21 NOTE — PROGRESS NOTES
[facility] Medicine Daily Progress Note    Date of Service  7/21/2019    Chief Complaint  53 y.o. male admitted 7/20/2019 with abd pain w N/V    Hospital Course    52 yo male hx of colon cancer and colostomy admitted with loss of ostomy output x 3 days, pain and n.v.  Findings of of incarcerated hernia and small bowel obstruction general surgery at Dr. Soler has been consulted    Interval Problem Update     Ileostomy with increased mod output and gas. NGT clamped . Wants to eat.   Plan upper GI series.      Consultants/Specialty    Dr. Soler, surgery     Code Status  Full     Disposition  Anticipate dc home when stable.     Review of Systems  Review of Systems   Constitutional: Negative for chills, diaphoresis, fever and malaise/fatigue.   HENT: Negative for congestion.    Respiratory: Negative for cough, shortness of breath, wheezing and stridor.    Cardiovascular: Negative for chest pain, palpitations and leg swelling.   Gastrointestinal: Positive for abdominal pain, nausea and vomiting. Negative for diarrhea.        Resolving.    Genitourinary: Negative for flank pain and hematuria.   Musculoskeletal: Negative for back pain, joint pain and neck pain.   Neurological: Negative for tremors, sensory change, speech change, focal weakness and weakness.        Physical Exam  Temp:  [36.4 °C (97.6 °F)-37.1 °C (98.8 °F)] 37.1 °C (98.8 °F)  Pulse:  [] 82  Resp:  [1-18] 17  BP: (132-145)/() 145/82  SpO2:  [97 %-100 %] 97 %    Physical Exam   Constitutional: He is oriented to person, place, and time. No distress.   HENT:   Head: Normocephalic and atraumatic.   Nose: Nose normal.   Ngt clamped    Eyes: Conjunctivae and EOM are normal. No scleral icterus.   Neck: Normal range of motion. No JVD present.   Cardiovascular: Normal rate and regular rhythm.    No murmur heard.  Pulmonary/Chest: Effort normal. No stridor. No respiratory distress. He has no wheezes. He has no rales.   Abdominal: Soft. He exhibits no  distension. There is no tenderness.   Ostomy w small amount of gas , liquid   Musculoskeletal: He exhibits no edema or tenderness.   Neurological: He is alert and oriented to person, place, and time.   No gross focal weakness   Skin: Skin is warm and dry. He is not diaphoretic. No pallor.   Psychiatric: He has a normal mood and affect. His behavior is normal.   Vitals reviewed.      Fluids    Intake/Output Summary (Last 24 hours) at 07/21/19 1115  Last data filed at 07/21/19 0100   Gross per 24 hour   Intake            222.5 ml   Output              400 ml   Net           -177.5 ml       Laboratory  Recent Labs      07/20/19 1818 07/21/19   0149   WBC  9.6  7.6   RBC  5.28  4.71   HEMOGLOBIN  13.1*  11.7*   HEMATOCRIT  43.2  38.7*   MCV  81.8  82.2   MCH  24.8*  24.8*   MCHC  30.3*  30.2*   RDW  51.5*  52.1*   PLATELETCT  321  262   MPV  8.3*  8.7*     Recent Labs      07/20/19 1818 07/21/19   0149   SODIUM  139  138   POTASSIUM  4.4  4.2   CHLORIDE  107  111   CO2  23  19*   GLUCOSE  88  90   BUN  34*  30*   CREATININE  1.86*  1.57*   CALCIUM  9.5  8.9     Recent Labs      07/21/19 0149   APTT  30.5   INR  1.05               Imaging  CT-ABDOMEN-PELVIS W/O   Final Result      1.  Right lower quadrant colostomy with peristomal colonic herniation which appears to be at least partly incarcerated, likely accounting for the generalized dilated colon and small bowel throughout the abdomen i.e., at least a partial obstruction.      DX-UPPER GI-SMALL BOWEL FOLLOW THRU    (Results Pending)        Assessment/Plan  * Small bowel obstruction (HCC)   Assessment & Plan    Clinically improving.   Monitor ileostomy output.  Continue IVFs  NGT clamped.  SBFT- if no obstruction - consider started diet.   Surgery input.      Anemia   Assessment & Plan    Mild, no evidence of bleeding, chronic dz, nutritional , dilution  Check iron studies , b12, folate.      Incarcerated hernia   Assessment & Plan    Signs improvement --  continue NPO, IVFs  Fu SBFT,   Surgery input.   IVFs  Prn antiemetics  Pain control      CLINTON (acute kidney injury) (HCC)   Assessment & Plan    This is hypovolemic due to vomiting and poor intake - Cr improved to 1.56  Continue IVFs until consistent intake.  Avoid nephrotoxic medications  Check am renal fxn, lytes and keep eye on UOP.     Tobacco abuse   Assessment & Plan    Greater than 10 minutes spent with patient smoking cessation counseling started. Emphasize cessation  Nicotine patch.          VTE prophylaxis: SCDs

## 2019-07-21 NOTE — PROGRESS NOTES
2 RN skin check completed with Haydee BUCKLEY. No skin break down noted to extremities from cuffs. Adhesive foams placed for prevention. 2nd toe on right foot purple. Face blistered. Patient can turn self from side to side and does so frequently.

## 2019-07-21 NOTE — ASSESSMENT & PLAN NOTE
This is hypovolemic due to vomiting and poor intake -renal function corrected  Start diet  Avoid nephrotoxic medications

## 2019-07-22 LAB
ANION GAP SERPL CALC-SCNC: 8 MMOL/L (ref 0–11.9)
BUN SERPL-MCNC: 22 MG/DL (ref 8–22)
CALCIUM SERPL-MCNC: 9 MG/DL (ref 8.5–10.5)
CHLORIDE SERPL-SCNC: 107 MMOL/L (ref 96–112)
CO2 SERPL-SCNC: 19 MMOL/L (ref 20–33)
CREAT SERPL-MCNC: 1.28 MG/DL (ref 0.5–1.4)
FERRITIN SERPL-MCNC: 43 NG/ML (ref 22–322)
GLUCOSE SERPL-MCNC: 81 MG/DL (ref 65–99)
IRON SERPL-MCNC: 41 UG/DL (ref 50–180)
POTASSIUM SERPL-SCNC: 4.1 MMOL/L (ref 3.6–5.5)
SODIUM SERPL-SCNC: 134 MMOL/L (ref 135–145)
TRANSFERRIN SERPL-MCNC: 234 MG/DL (ref 200–370)
VIT B12 SERPL-MCNC: 240 PG/ML (ref 211–911)

## 2019-07-22 PROCEDURE — 82607 VITAMIN B-12: CPT

## 2019-07-22 PROCEDURE — 302098 PASTE RING (FLAT): Performed by: HOSPITALIST

## 2019-07-22 PROCEDURE — 36415 COLL VENOUS BLD VENIPUNCTURE: CPT

## 2019-07-22 PROCEDURE — 99232 SBSQ HOSP IP/OBS MODERATE 35: CPT | Performed by: HOSPITALIST

## 2019-07-22 PROCEDURE — 84466 ASSAY OF TRANSFERRIN: CPT

## 2019-07-22 PROCEDURE — A9270 NON-COVERED ITEM OR SERVICE: HCPCS | Performed by: HOSPITALIST

## 2019-07-22 PROCEDURE — 700105 HCHG RX REV CODE 258: Performed by: HOSPITALIST

## 2019-07-22 PROCEDURE — 74245 DX-UPPER GI-SMALL BOWEL FOLLOW THRU: CPT

## 2019-07-22 PROCEDURE — 770001 HCHG ROOM/CARE - MED/SURG/GYN PRIV*

## 2019-07-22 PROCEDURE — 700102 HCHG RX REV CODE 250 W/ 637 OVERRIDE(OP): Performed by: HOSPITALIST

## 2019-07-22 PROCEDURE — 700111 HCHG RX REV CODE 636 W/ 250 OVERRIDE (IP): Performed by: HOSPITALIST

## 2019-07-22 PROCEDURE — 83540 ASSAY OF IRON: CPT

## 2019-07-22 PROCEDURE — 82728 ASSAY OF FERRITIN: CPT

## 2019-07-22 PROCEDURE — 80048 BASIC METABOLIC PNL TOTAL CA: CPT

## 2019-07-22 PROCEDURE — 306591 TRAY SUTURE REMOVAL DISP: Performed by: HOSPITALIST

## 2019-07-22 RX ADMIN — MORPHINE SULFATE 4 MG: 4 INJECTION INTRAVENOUS at 07:22

## 2019-07-22 RX ADMIN — MORPHINE SULFATE 4 MG: 4 INJECTION INTRAVENOUS at 15:10

## 2019-07-22 RX ADMIN — SODIUM CHLORIDE: 9 INJECTION, SOLUTION INTRAVENOUS at 12:03

## 2019-07-22 RX ADMIN — OXYCODONE HYDROCHLORIDE 10 MG: 10 TABLET ORAL at 13:56

## 2019-07-22 RX ADMIN — MORPHINE SULFATE 4 MG: 4 INJECTION INTRAVENOUS at 21:19

## 2019-07-22 RX ADMIN — MORPHINE SULFATE 4 MG: 4 INJECTION INTRAVENOUS at 04:05

## 2019-07-22 RX ADMIN — SODIUM CHLORIDE: 9 INJECTION, SOLUTION INTRAVENOUS at 18:14

## 2019-07-22 RX ADMIN — MORPHINE SULFATE 4 MG: 4 INJECTION INTRAVENOUS at 10:22

## 2019-07-22 RX ADMIN — MORPHINE SULFATE 4 MG: 4 INJECTION INTRAVENOUS at 18:14

## 2019-07-22 RX ADMIN — SODIUM CHLORIDE: 9 INJECTION, SOLUTION INTRAVENOUS at 04:07

## 2019-07-22 ASSESSMENT — ENCOUNTER SYMPTOMS
SHORTNESS OF BREATH: 0
NAUSEA: 1
ABDOMINAL PAIN: 1
STRIDOR: 0
FOCAL WEAKNESS: 0
FLANK PAIN: 0
FEVER: 0
DIARRHEA: 0
PALPITATIONS: 0
VOMITING: 1
CHILLS: 0
SPEECH CHANGE: 0
NECK PAIN: 0
BACK PAIN: 0
SENSORY CHANGE: 0
COUGH: 0
DIAPHORESIS: 0
WHEEZING: 0
WEAKNESS: 0
TREMORS: 0

## 2019-07-22 NOTE — PROGRESS NOTES
Patient removed NG tube while changing gown. Anna hospitalist, spoke to MD Fern Alfaro to update about patient condition and was told  leave NG tube out unless patient begins to experience nausea.

## 2019-07-22 NOTE — CARE PLAN
Problem: Communication  Goal: The ability to communicate needs accurately and effectively will improve  Outcome: PROGRESSING AS EXPECTED  Provided patient education about use of call light when needing assistance or wanting to get out of bed, patient verbalizes understanding and demonstrates task appropriately. Call light with in reach, hourly rounding in place.     Problem: Pain Management  Goal: Pain level will decrease to patient's comfort goal  Outcome: PROGRESSING AS EXPECTED  Patient aware of pharmacological interventions available, verbalizes understanding. Education about non-pharmacological interventions provided, patient verbalizes understanding.

## 2019-07-22 NOTE — DISCHARGE PLANNING
Care Transition Team Assessment    Information Source  Orientation : Oriented x 4  Information Given By: Other (Comments) (medical record)  Who is responsible for making decisions for patient? : Patient         Elopement Risk  Legal Hold: No  Ambulatory or Self Mobile in Wheelchair: No-Not an Elopement Risk  Elopement Risk: Not at Risk for Elopement    Interdisciplinary Discharge Planning  Patient or legal guardian wants to designate a caregiver (see row info): No    Discharge Preparedness  What is your plan after discharge?: Other (comment) ([facility])  Prior Functional Level: Independent with Activities of Daily Living    Functional Assesment  Prior Functional Level: Independent with Activities of Daily Living    Finances  Financial Barriers to Discharge: No                   Domestic Abuse  Have you ever been the victim of abuse or violence?: No    Psychological Assessment  History of Substance Abuse: None  History of Psychiatric Problems: No    Discharge Risks or Barriers  Discharge risks or barriers?: Post-acute placement / services    Anticipated Discharge Information  Anticipated discharge disposition: Other (comment) ([facility])

## 2019-07-22 NOTE — PROGRESS NOTES
Surgery    No acute events.  SBFT shows mild delay of barium through small bowel but empties into the ileostomy.    NAD, awake, alert  Breathing is nonlabored on room air  Abdomen is soft, nontender, nondistended with ileostomy in place    -Recommend full liquid diet for lunch, with advancement to GI soft diet later today as tolerated.  -Given history of colorectal cancer, recommend outpatient evaluation by colorectal surgery for possible elective parastomal hernia repair.  Patient should follow with Farshad Haro MD of [facility].      Shady Soler M.D.  [facility]  [phone]

## 2019-07-22 NOTE — DIETARY
"Nutrition services: Day 2 of admit.  Federico Chaney is a 53 y.o. male with admitting DX of Small bowel obstruction (HCC)  Hx of colorectal cancer     Consult received for MST score = 2 d/t weight loss PTA.  -patient with SBO s/p small bowel follow through today - showed mild delay of barium through small bowel; emptied into ileostomy, per surgery note 7/22.  -Full liquids started at lunch today, which patient tolerated well.  He denies N/V at this time.    -Patient reports regular access to food at facility where he resides and denies changes in weight to his knowledge over the last 3-6 months.     Assessment:  Height: 180.3 cm (5' 11\")  Weight: 86.6 kg (190 lb 14.7 oz)  Body mass index is 26.63 kg/m²., BMI classification: overweight category   Diet/Intake: FLD    Evaluation:   1. Diet advancement Ok per surgeon to GI Soft/Low Fiber - agree with this.  2. +BM today, ileostomy   3. Labs and meds reviewed.     Malnutrition Risk: N/a    Recommendations/Plan:  1. ADAT to GI Soft/ Low Fiber d/t ileostomy.   2. Encourage intake of meals and snacks   3. Document intake of all meals and snacks  as % taken in ADL's to provide interdisciplinary communication across all shifts.   4. Monitor weight.  5. Nutrition rep will continue to see patient for ongoing meal and snack preferences.           "

## 2019-07-23 ENCOUNTER — HOSPITAL ENCOUNTER (INPATIENT)
Dept: HOSPITAL 8 - ED | Age: 54
LOS: 1 days | Discharge: HOME | DRG: 394 | End: 2019-07-24
Attending: INTERNAL MEDICINE | Admitting: INTERNAL MEDICINE
Payer: COMMERCIAL

## 2019-07-23 VITALS
TEMPERATURE: 98.1 F | HEIGHT: 71 IN | BODY MASS INDEX: 26.73 KG/M2 | HEART RATE: 84 BPM | OXYGEN SATURATION: 93 % | WEIGHT: 190.92 LBS | DIASTOLIC BLOOD PRESSURE: 86 MMHG | RESPIRATION RATE: 16 BRPM | SYSTOLIC BLOOD PRESSURE: 139 MMHG

## 2019-07-23 VITALS — BODY MASS INDEX: 25 KG/M2 | HEIGHT: 71 IN | WEIGHT: 178.57 LBS

## 2019-07-23 DIAGNOSIS — Z85.038: ICD-10-CM

## 2019-07-23 DIAGNOSIS — F11.90: ICD-10-CM

## 2019-07-23 DIAGNOSIS — D64.9: ICD-10-CM

## 2019-07-23 DIAGNOSIS — Z93.2: ICD-10-CM

## 2019-07-23 DIAGNOSIS — Z80.0: ICD-10-CM

## 2019-07-23 DIAGNOSIS — K43.3: Primary | ICD-10-CM

## 2019-07-23 DIAGNOSIS — Z91.041: ICD-10-CM

## 2019-07-23 DIAGNOSIS — Z88.8: ICD-10-CM

## 2019-07-23 DIAGNOSIS — C18.9: ICD-10-CM

## 2019-07-23 DIAGNOSIS — Z88.6: ICD-10-CM

## 2019-07-23 DIAGNOSIS — I10: ICD-10-CM

## 2019-07-23 DIAGNOSIS — Z76.5: ICD-10-CM

## 2019-07-23 DIAGNOSIS — Q85.00: ICD-10-CM

## 2019-07-23 DIAGNOSIS — F17.210: ICD-10-CM

## 2019-07-23 PROBLEM — N17.9 AKI (ACUTE KIDNEY INJURY) (HCC): Status: RESOLVED | Noted: 2019-07-20 | Resolved: 2019-07-23

## 2019-07-23 PROBLEM — K56.609 SMALL BOWEL OBSTRUCTION (HCC): Status: RESOLVED | Noted: 2019-07-20 | Resolved: 2019-07-23

## 2019-07-23 PROCEDURE — 96374 THER/PROPH/DIAG INJ IV PUSH: CPT

## 2019-07-23 PROCEDURE — A9270 NON-COVERED ITEM OR SERVICE: HCPCS | Performed by: HOSPITALIST

## 2019-07-23 PROCEDURE — 700105 HCHG RX REV CODE 258: Performed by: HOSPITALIST

## 2019-07-23 PROCEDURE — 36415 COLL VENOUS BLD VENIPUNCTURE: CPT

## 2019-07-23 PROCEDURE — 700102 HCHG RX REV CODE 250 W/ 637 OVERRIDE(OP): Performed by: HOSPITALIST

## 2019-07-23 PROCEDURE — 83690 ASSAY OF LIPASE: CPT

## 2019-07-23 PROCEDURE — 74021 RADEX ABDOMEN 3+ VIEWS: CPT

## 2019-07-23 PROCEDURE — 700111 HCHG RX REV CODE 636 W/ 250 OVERRIDE (IP): Performed by: HOSPITALIST

## 2019-07-23 PROCEDURE — 96375 TX/PRO/DX INJ NEW DRUG ADDON: CPT

## 2019-07-23 PROCEDURE — 99239 HOSP IP/OBS DSCHRG MGMT >30: CPT | Performed by: INTERNAL MEDICINE

## 2019-07-23 PROCEDURE — 80053 COMPREHEN METABOLIC PANEL: CPT

## 2019-07-23 PROCEDURE — 96376 TX/PRO/DX INJ SAME DRUG ADON: CPT

## 2019-07-23 PROCEDURE — 74176 CT ABD & PELVIS W/O CONTRAST: CPT

## 2019-07-23 PROCEDURE — 85025 COMPLETE CBC W/AUTO DIFF WBC: CPT

## 2019-07-23 RX ORDER — OXYCODONE HYDROCHLORIDE 5 MG/1
5 TABLET ORAL EVERY 8 HOURS PRN
Qty: 9 TAB | Refills: 0 | Status: SHIPPED | OUTPATIENT
Start: 2019-07-23 | End: 2019-07-26

## 2019-07-23 RX ORDER — SENNA AND DOCUSATE SODIUM 50; 8.6 MG/1; MG/1
1 TABLET, FILM COATED ORAL DAILY
Qty: 15 TAB | Refills: 0 | Status: SHIPPED | OUTPATIENT
Start: 2019-07-23

## 2019-07-23 RX ADMIN — MORPHINE SULFATE 4 MG: 4 INJECTION INTRAVENOUS at 03:03

## 2019-07-23 RX ADMIN — OXYCODONE HYDROCHLORIDE 5 MG: 5 TABLET ORAL at 00:54

## 2019-07-23 RX ADMIN — OXYCODONE HYDROCHLORIDE 10 MG: 10 TABLET ORAL at 09:00

## 2019-07-23 RX ADMIN — MORPHINE SULFATE 4 MG: 4 INJECTION INTRAVENOUS at 06:57

## 2019-07-23 RX ADMIN — OXYCODONE HYDROCHLORIDE 10 MG: 10 TABLET ORAL at 05:37

## 2019-07-23 RX ADMIN — SODIUM CHLORIDE: 9 INJECTION, SOLUTION INTRAVENOUS at 01:07

## 2019-07-23 RX ADMIN — SODIUM CHLORIDE: 9 INJECTION, SOLUTION INTRAVENOUS at 06:57

## 2019-07-23 NOTE — PROGRESS NOTES
[facility] Medicine Daily Progress Note    Date of Service  7/22/2019    Chief Complaint  53 y.o. male admitted 7/20/2019 with abd pain w N/V    Hospital Course    54 yo male hx of colon cancer and colostomy admitted with loss of ostomy output x 3 days, pain and n.v.  Findings of of incarcerated hernia and small bowel obstruction general surgery at Dr. Soler has been consulted    Interval Problem Update    No abdominal pain or nausea, vomiting.  Passing gas into ostomy.  Patient hungry.      SBFT : No evidence of Gastric outlet obstruction, some delayed transit of barium through small bowel.     Consultants/Specialty    Dr. Soler, surgery     Code Status  Full     Disposition  Anticipate dc to [facility] when stable.  Possibly in a.m. if tolerates diet    Review of Systems  Review of Systems   Constitutional: Negative for chills, diaphoresis, fever and malaise/fatigue.   HENT: Negative for congestion.    Respiratory: Negative for cough, shortness of breath, wheezing and stridor.    Cardiovascular: Negative for chest pain, palpitations and leg swelling.   Gastrointestinal: Positive for abdominal pain, nausea and vomiting. Negative for diarrhea.        Resolving.    Genitourinary: Negative for flank pain and hematuria.   Musculoskeletal: Negative for back pain, joint pain and neck pain.   Neurological: Negative for tremors, sensory change, speech change, focal weakness and weakness.        Physical Exam  Temp:  [36.6 °C (97.9 °F)-36.9 °C (98.4 °F)] 36.6 °C (97.9 °F)  Pulse:  [68-80] 70  Resp:  [16-17] 17  BP: (143-169)/(83-99) 143/84  SpO2:  [96 %-100 %] 96 %    Physical Exam   Constitutional: He is oriented to person, place, and time. No distress.   HENT:   Head: Normocephalic and atraumatic.   Nose: Nose normal.   Eyes: Conjunctivae and EOM are normal.   Neck: Normal range of motion.   Cardiovascular: Normal rate and regular rhythm.    Pulmonary/Chest: No stridor. He has no wheezes. He has no rales.   Abdominal: Soft.  Bowel sounds are normal. He exhibits no distension. There is no tenderness.   Ostomy with gas present.   Musculoskeletal: He exhibits no edema or tenderness.   Neurological: He is alert and oriented to person, place, and time.   No gross focal weakness   Skin: Skin is warm and dry. He is not diaphoretic. No pallor.   Vitals reviewed.      Fluids  No intake or output data in the 24 hours ending 07/22/19 1726    Laboratory  Recent Labs      07/20/19 1818 07/21/19   0149   WBC  9.6  7.6   RBC  5.28  4.71   HEMOGLOBIN  13.1*  11.7*   HEMATOCRIT  43.2  38.7*   MCV  81.8  82.2   MCH  24.8*  24.8*   MCHC  30.3*  30.2*   RDW  51.5*  52.1*   PLATELETCT  321  262   MPV  8.3*  8.7*     Recent Labs      07/20/19 1818 07/21/19 0149 07/22/19   0339   SODIUM  139  138  134*   POTASSIUM  4.4  4.2  4.1   CHLORIDE  107  111  107   CO2  23  19*  19*   GLUCOSE  88  90  81   BUN  34*  30*  22   CREATININE  1.86*  1.57*  1.28   CALCIUM  9.5  8.9  9.0     Recent Labs      07/21/19 0149   APTT  30.5   INR  1.05               Imaging  DX-UPPER GI-SMALL BOWEL FOLLOW THRU   Final Result      Mild esophageal dysmotility.   No evidence gastric outlet obstruction.   Delayed transit of barium through the small bowel with abrupt emptying of barium into the ileostomy. Findings are consistent with intermittent low-grade obstruction.      CT-ABDOMEN-PELVIS W/O   Final Result      1.  Right lower quadrant colostomy with peristomal colonic herniation which appears to be at least partly incarcerated, likely accounting for the generalized dilated colon and small bowel throughout the abdomen i.e., at least a partial obstruction.           Assessment/Plan  * Small bowel obstruction (HCC)   Assessment & Plan    Clinically resolved.   Start full liquid diet and advance as tolerated  Surgery input.      Incarcerated hernia   Assessment & Plan    Clinically better.  SBFT without signs of obstruction  IV fluids, plan to start full liquid diet and  advance as tolerated  Surgery input.   Prn antiemetics       Anemia   Assessment & Plan    Mild, no evidence of bleeding-possibly due to chronic dz, nutritional , dilution.  B12, thyroid function normal         CLINTON (acute kidney injury) (HCC)   Assessment & Plan    This is hypovolemic due to vomiting and poor intake -renal function corrected  Start diet  Avoid nephrotoxic medications       Tobacco abuse   Assessment & Plan    Greater than 10 minutes spent with patient smoking cessation counseling started. Emphasize cessation  Nicotine patch.          VTE prophylaxis: SCDs    Discussed with case management plan of care.

## 2019-07-23 NOTE — PROGRESS NOTES
Pt is being discharged from the hospital. His IV has been removed. His discharge paperwork has been reviewed, signed, and placed in chart. I wheeled the patient down the the [employer] transportation to go back to correction.

## 2019-07-23 NOTE — DISCHARGE INSTRUCTIONS
Low-Fiber Diet  Fiber is found in fruits, vegetables, and whole grains. A low-fiber diet restricts fibrous foods that are not digested in the small intestine. A diet containing about 10-15 grams of fiber per day is considered low fiber. Low-fiber diets may be used to:  · Promote healing and rest the bowel during intestinal flare-ups.  · Prevent blockage of a partially obstructed or narrowed gastrointestinal tract.  · Reduce fecal weight and volume.  · Slow the movement of feces.  You may be on a low-fiber diet as a transitional diet following surgery, after an injury (trauma), or because of a short (acute) or lifelong (chronic) illness. Your health care provider will determine the length of time you need to stay on this diet.  What do I need to know about a low-fiber diet?  Always check the fiber content on the packaging's Nutrition Facts label, especially on foods from the grains list. Ask your dietitian if you have questions about specific foods that are related to your condition, especially if the food is not listed below. In general, a low-fiber food will have less than 2 g of fiber.  What foods can I eat?  Grains   All breads and crackers made with white flour. Sweet rolls, doughnuts, waffles, pancakes, Dutch toast, bagels. Pretzels, Kalyn toast, zwieback. Well-cooked cereals, such as cornmeal, farina, or cream cereals. Dry cereals that do not contain whole grains, fruit, or nuts, such as refined corn, wheat, rice, and oat cereals. Potatoes prepared any way without skins, plain pastas and noodles, refined white rice. Use white flour for baking and making sauces. Use allowed list of grains for casseroles, dumplings, and puddings.  Vegetables   Strained tomato and vegetable juices. Fresh lettuce, cucumber, spinach. Well-cooked (no skin or pulp) or canned vegetables, such as asparagus, bean sprouts, beets, carrots, green beans, mushrooms, potatoes, pumpkin, spinach, yellow squash, tomato sauce/puree, turnips,  yams, and zucchini. Keep servings limited to ½ cup.  Fruits   All fruit juices except prune juice. Cooked or canned fruits without skin and seeds, such as applesauce, apricots, cherries, fruit cocktail, grapefruit, grapes, mandarin oranges, melons, peaches, pears, pineapple, and plums. Fresh fruits without skin, such as apricots, avocados, bananas, melons, pineapple, nectarines, and peaches. Keep servings limited to ½ cup or 1 piece.  Meat and Other Protein Sources   Ground or well-cooked tender beef, ham, veal, lamb, pork, or poultry. Eggs, plain cheese. Fish, oysters, shrimp, lobster, and other seafood. Liver, organ meats. Smooth nut butters.  Dairy   All milk products and alternative dairy substitutes, such as soy, rice, almond, and coconut, not containing added whole nuts, seeds, or added fruit.  Beverages   Decaf coffee, fruit, and vegetable juices or smoothies (small amounts, with no pulp or skins, and with fruits from allowed list), sports drinks, herbal tea.  Condiments   Ketchup, mustard, vinegar, cream sauce, cheese sauce, cocoa powder. Spices in moderation, such as allspice, basil, bay leaves, celery powder or leaves, cinnamon, cumin powder, lozada powder, anay, mace, marjoram, onion or garlic powder, oregano, paprika, parsley flakes, ground pepper, rosemary, destiney, savory, tarragon, thyme, and turmeric.  Sweets and Desserts   Plain cakes and cookies, pie made with allowed fruit, pudding, custard, cream pie. Gelatin, fruit, ice, sherbet, frozen ice pops. Ice cream, ice milk without nuts. Plain hard candy, honey, jelly, molasses, syrup, sugar, chocolate syrup, gumdrops, marshmallows. Limit overall sugar intake.  Fats and Oil   Margarine, butter, cream, mayonnaise, salad oils, plain salad dressings made from allowed foods. Choose healthy fats such as olive oil, canola oil, and omega-3 fatty acids (such as found in salmon or tuna) when possible.  Other   Bouillon, broth, or cream soups made from allowed  foods. Any strained soup. Casseroles or mixed dishes made with allowed foods.  The items listed above may not be a complete list of recommended foods or beverages. Contact your dietitian for more options.     What foods are not recommended?  Grains   All whole wheat and whole grain breads and crackers. Multigrains, rye, bran seeds, nuts, or coconut. Cereals containing whole grains, multigrains, bran, coconut, nuts, raisins. Cooked or dry oatmeal, steel-cut oats. Coarse wheat cereals, granola. Cereals advertised as high fiber. Potato skins. Whole grain pasta, wild or brown rice. Popcorn. Coconut flour. Bran, buckwheat, corn bread, multigrains, rye, wheat germ.  Vegetables   Fresh, cooked or canned vegetables, such as artichokes, asparagus, beet greens, broccoli, [place] sprouts, cabbage, celery, cauliflower, corn, eggplant, kale, legumes or beans, okra, peas, and tomatoes. Avoid large servings of any vegetables, especially raw vegetables.  Fruits   Fresh fruits, such as apples with or without skin, berries, cherries, figs, grapes, grapefruit, guavas, kiwis, mangoes, oranges, papayas, pears, persimmons, pineapple, and pomegranate. Prune juice and juices with pulp, stewed or dried prunes. Dried fruits, dates, raisins. Fruit seeds or skins. Avoid large servings of all fresh fruits.  Meats and Other Protein Sources   Tough, fibrous meats with gristle. [place] nut butter. Cheese made with seeds, nuts, or other foods not recommended. Nuts, seeds, legumes (beans, including baked beans), dried peas, beans, lentils.  Dairy   Yogurt or cheese that contains nuts, seeds, or added fruit.  Beverages   Fruit juices with high pulp, prune juice. Caffeinated coffee and teas.  Condiments   Coconut, maple syrup, pickles, olives.  Sweets and Desserts   Desserts, cookies, or candies that contain nuts or coconut, chunky peanut butter, dried fruits. Jams, preserves with seeds, marmalade. Large amounts of sugar and sweets. Any other dessert  made with fruits from the not recommended list.  Other   Soups made from vegetables that are not recommended or that contain other foods not recommended.  The items listed above may not be a complete list of foods and beverages to avoid. Contact your dietitian for more information.   This information is not intended to replace advice given to you by your health care provider. Make sure you discuss any questions you have with your health care provider.  Document Released: 06/09/2003 Document Revised: 05/25/2017 Document Reviewed: 11/10/2014  [employer] Patient Education © 2017 [employer]  Discharge Instructions    Discharged to other by car with escort. Discharged via wheelchair, hospital escort: Yes.  Special equipment needed: Not Applicable    Be sure to schedule a follow-up appointment with your primary care doctor or any specialists as instructed.     Discharge Plan:   Diet Plan: Discussed  Activity Level: Discussed  Confirmed Follow up Appointment: Patient to Call and Schedule Appointment  Confirmed Symptoms Management: Discussed  Medication Reconciliation Updated: Yes  Influenza Vaccine Indication: Patient Refuses    I understand that a diet low in cholesterol, fat, and sodium is recommended for good health. Unless I have been given specific instructions below for another diet, I accept this instruction as my diet prescription.   Other diet: GI soft    Special Instructions: None    · Is patient discharged on Warfarin / Coumadin?   No     Depression / Suicide Risk    As you are discharged from this [facility] facility, it is important to learn how to keep safe from harming yourself.    Recognize the warning signs:  · Abrupt changes in personality, positive or negative- including increase in energy   · Giving away possessions  · Change in eating patterns- significant weight changes-  positive or negative  · Change in sleeping patterns- unable to sleep or sleeping all the time   · Unwillingness or  inability to communicate  · Depression  · Unusual sadness, discouragement and loneliness  · Talk of wanting to die  · Neglect of personal appearance   · Rebelliousness- reckless behavior  · Withdrawal from people/activities they love  · Confusion- inability to concentrate     If you or a loved one observes any of these behaviors or has concerns about self-harm, here's what you can do:  · Talk about it- your feelings and reasons for harming yourself  · Remove any means that you might use to hurt yourself (examples: pills, rope, extension cords, firearm)  · Get professional help from the community (Mental Health, Substance Abuse, psychological counseling)  · Do not be alone:Call your Safe Contact- someone whom you trust who will be there for you.  · Call your local CRISIS HOTLINE 113-8211 or 311-666-6597  · Call your local Children's Mobile Crisis Response Team [place] (431) 257-8104 or www.Ansible  · Call the toll free National Suicide Prevention Hotlines   · National Suicide Prevention Lifeline 536-256-IBDR (3305)  · National Hope Line Network 800-SUICIDE (757-7954)

## 2019-07-23 NOTE — WOUND TEAM
Wound consult placed for evaluation of established ileostomy.  Per staff RN, staff replaced appliance during the night due to blow out due to flatus.  Checked with patient who is a prisoner so no supplies to be left in room.  He is pleased with one piece appliance and reports that he uses a paste ring.  Supplies ordered and discussed with staff RN to place in ziplock bag outside room when they arrive on unit.  Patient is independent with care.  No return visit planned unless contacted by staff.

## 2019-07-23 NOTE — CARE PLAN
Problem: Infection  Goal: Will remain free from infection  Outcome: PROGRESSING AS EXPECTED  Performed hang hygiene before and after patient interaction, standard safety precautions in place.     Problem: Pain Management  Goal: Pain level will decrease to patient's comfort goal  Outcome: PROGRESSING SLOWER THAN EXPECTED  Patient continues to verbalize intolerable pain despite pharmacological intervention.

## 2019-07-23 NOTE — DISCHARGE PLANNING
Anticipated Disposition:  Return to correctional facility.     Action:   This CM attempted to call the Receiving halfway nursing unit at [phone] no one answered my phone unable to leave messages.   Officer called [facility] RN, spoke with her over the phone, she is okay to take Pt back. Per their policy they are unable to give Oxycodone, Informed .           Barriers to Discharge:   None      Plan:  DC  today.

## 2019-07-23 NOTE — DISCHARGE SUMMARY
Hospital Medicine Discharge Note     Admit Date:  7/20/2019       Discharge Date:   7/23/2019    Attending Physician:  Sherri Kimble M.D.      Diagnoses (includes active and resolved):     Principal Problem:    Small bowel obstruction (HCC) POA: Unknown  Active Problems:    Incarcerated hernia POA: Unknown    Tobacco abuse POA: Unknown    CLINTON (acute kidney injury) (HCC) POA: Unknown    Anemia POA: Unknown  Resolved Problems:    Small bowel obstruction    Incarcerated Hernia     CLINTON    Hospital Summary (Brief Narrative):         53-year-old male with past medical history of colon cancer and ileostomy admitted to the hospital on July 20, 2019 with loss of ostomy output x 3 days, abdominal pain with nausea and vomiting.  He had CT findings of incarcerated hernia and small bowel obstruction general surgery Dr. Soler has been consulted.  Patient was kept n.p.o. and received IV fluids.  He was started on as needed antiemetics and pain relievers.  Following day , patient symptoms improved and he had moderate output of liquid stool and air in ileostomy.  Dr. Soler recommended SBFT.  Results revealed mild esophageal dysmotility no evidence of gastric outlet obstruction.  There was some delayed emptying.  Possible intermittent low-grade obstruction.  Patient was started on full liquid diet and advance to GI soft.  He had acute kidney injury with creatinine 1.86 normalized to 1.2 prior to discharge.  With treatment patient symptoms markedly improved and he has been tolerating diet.  Is recommended by Dr. Soler patient could follow-up with colorectal surgery for evaluation elective parastomal hernia repair.  I evaluated and examined him at the bedside and he reported that his symptoms has significantly improved and he has mild abdominal pain.  Initially I prescribed him oxycodone as he has been taking it but he cannot take oxycodone and present and I recommended Tylenol.  Also, I recommended him to follow-up with  colorectal surgery for evaluation of elective parastomal hernia repair and he expressed understanding.  Today on the day of discharge he feels significantly better and he does not show any signs of acute distress.      Consultants:        Dr. Shady Soler, surgery     Imaging/ Testing:      DX-UPPER GI-SMALL BOWEL FOLLOW THRU   Final Result      Mild esophageal dysmotility.   No evidence gastric outlet obstruction.   Delayed transit of barium through the small bowel with abrupt emptying of barium into the ileostomy. Findings are consistent with intermittent low-grade obstruction.      CT-ABDOMEN-PELVIS W/O   Final Result      1.  Right lower quadrant colostomy with peristomal colonic herniation which appears to be at least partly incarcerated, likely accounting for the generalized dilated colon and small bowel throughout the abdomen i.e., at least a partial obstruction.            Procedures:          None    Discharge Medications:             Medication List      START taking these medications      Instructions   oxyCODONE immediate-release 5 MG Tabs  Commonly known as:  ROXICODONE   Take 1 Tab by mouth every 8 hours as needed for Severe Pain for up to 3 days.  Dose:  5 mg     sennosides-docusate sodium 8.6-50 MG tablet  Commonly known as:  SENOKOT-S   Take 1 Tab by mouth every day.  Dose:  1 Tab               Diet:       DIET ORDERS (Through next 24h)    Start     Ordered    07/22/19 1400  Diet Order Low Fiber(GI Soft)  ALL MEALS     Question:  Diet:  Answer:  Low Fiber(GI Soft)    07/22/19 1359            Activity:   As tolerated.      Code status:   Full code    Primary Care Provider:    Pcp Pt States None    Follow up appointment details :      .  Shady Soler M.D.  [address] [id number] [phone]    In 4 weeks      Time spent on discharge day patient visit: 41 minutes

## 2019-07-24 VITALS — DIASTOLIC BLOOD PRESSURE: 105 MMHG | SYSTOLIC BLOOD PRESSURE: 172 MMHG
